# Patient Record
Sex: MALE | Race: WHITE | NOT HISPANIC OR LATINO | Employment: UNEMPLOYED | URBAN - METROPOLITAN AREA
[De-identification: names, ages, dates, MRNs, and addresses within clinical notes are randomized per-mention and may not be internally consistent; named-entity substitution may affect disease eponyms.]

---

## 2017-01-30 ENCOUNTER — HOSPITAL ENCOUNTER (OUTPATIENT)
Dept: RADIOLOGY | Facility: OTHER | Age: 42
Discharge: HOME | End: 2017-01-30
Payer: OTHER GOVERNMENT

## 2017-01-30 ENCOUNTER — ALLSCRIPTS OFFICE VISIT (OUTPATIENT)
Dept: OTHER | Facility: OTHER | Age: 42
End: 2017-01-30

## 2017-01-30 DIAGNOSIS — M25.541 ARTHRALGIA OF RIGHT HAND: ICD-10-CM

## 2017-01-30 DIAGNOSIS — S62.364A: ICD-10-CM

## 2017-01-30 PROCEDURE — 73130 X-RAY EXAM OF HAND: CPT

## 2017-02-01 ENCOUNTER — GENERIC CONVERSION - ENCOUNTER (OUTPATIENT)
Dept: OTHER | Facility: OTHER | Age: 42
End: 2017-02-01

## 2017-03-01 ENCOUNTER — HOSPITAL ENCOUNTER (OUTPATIENT)
Dept: RADIOLOGY | Facility: HOSPITAL | Age: 42
Discharge: HOME | End: 2017-03-01
Attending: ORTHOPAEDIC SURGERY
Payer: OTHER GOVERNMENT

## 2017-03-01 ENCOUNTER — ALLSCRIPTS OFFICE VISIT (OUTPATIENT)
Dept: OTHER | Facility: OTHER | Age: 42
End: 2017-03-01

## 2017-03-01 DIAGNOSIS — S62.364A: ICD-10-CM

## 2017-03-01 PROCEDURE — 73130 X-RAY EXAM OF HAND: CPT

## 2017-03-02 ENCOUNTER — GENERIC CONVERSION - ENCOUNTER (OUTPATIENT)
Dept: OTHER | Facility: OTHER | Age: 42
End: 2017-03-02

## 2017-04-03 ENCOUNTER — ALLSCRIPTS OFFICE VISIT (OUTPATIENT)
Dept: OTHER | Facility: OTHER | Age: 42
End: 2017-04-03

## 2018-01-12 VITALS
HEART RATE: 80 BPM | WEIGHT: 173 LBS | DIASTOLIC BLOOD PRESSURE: 87 MMHG | HEIGHT: 60 IN | BODY MASS INDEX: 33.96 KG/M2 | SYSTOLIC BLOOD PRESSURE: 130 MMHG

## 2018-01-13 VITALS — SYSTOLIC BLOOD PRESSURE: 129 MMHG | DIASTOLIC BLOOD PRESSURE: 86 MMHG | HEART RATE: 83 BPM

## 2018-01-13 NOTE — RESULT NOTES
Verified Results  * XR HAND 3+ VIEW RIGHT 30Jan2017 09:04AM Mateusz Dunlap   TW Order Number: [id number]     Test Name Result Flag Reference   XR HAND 3+ VW RIGHT (Report)     RIGHT HAND     INDICATION: Evaluate hand fracture     COMPARISON: None     VIEWS: 3; 3 images     For the purposes of institution wide universal language the following terms will apply: (thumb=1st digit/finger, index finger=2nd digit/finger, long finger=3rd digit/finger, ring=4th digit/finger and small finger=5th digit/finger)     FINDINGS:     There is a slightly angulated fracture of the distal portion of the 4th metacarpal at the metacarpal neck region  This produces slight foreshortening of the bone  Remainder of hand appears intact  No degenerative changes  No lytic or blastic lesions are seen  Soft tissues are unremarkable         IMPRESSION:     Slightly angulated fracture of the 4th metacarpal neck       Workstation performed: [id number]     Signed by:   Oracio Mitchell MD   1/31/17

## 2018-01-14 VITALS — HEART RATE: 97 BPM | DIASTOLIC BLOOD PRESSURE: 75 MMHG | SYSTOLIC BLOOD PRESSURE: 116 MMHG

## 2018-01-16 NOTE — RESULT NOTES
Verified Results  * XR HAND 3+ VIEW RIGHT [id number] 12:57PM Ros[facility] BOX Order Number: [id number]   Performing Comments: room 7     Test Name Result Flag Reference   XR HAND 3+ VW RIGHT (Report)     RIGHT HAND     INDICATION: X50 182C: Nondisplaced fracture of neck of fourth metacarpal bone, right hand, initial encounter for closed fracture  History taken directly from the electronic ordering system  COMPARISON: Plain film performed on 1/30/2017     VIEWS: 3     IMAGES: 3     For the purposes of institution wide universal language the following terms will apply: (thumb=1st digit/finger, index finger=2nd digit/finger, long finger=3rd digit/finger, ring=4th digit/finger and small finger=5th digit/finger)     FINDINGS:     Comminuted and angulated fourth metacarpal head/neck fracture  Slight foreshortening without change in alignment  Periosteal reaction noted reflective of healing  No dislocation  No new fractures  No degenerative changes  No lytic or blastic lesions are seen  Soft tissues are unremarkable  IMPRESSION:     Healing fourth metacarpal head/neck fracture         Workstation performed: [id number]     Signed by:   Benedict Emery MD   3/2/17